# Patient Record
Sex: MALE | Race: OTHER | Employment: OTHER | URBAN - METROPOLITAN AREA
[De-identification: names, ages, dates, MRNs, and addresses within clinical notes are randomized per-mention and may not be internally consistent; named-entity substitution may affect disease eponyms.]

---

## 2017-10-20 ENCOUNTER — HOSPITAL ENCOUNTER (OUTPATIENT)
Dept: PHYSICAL THERAPY | Age: 62
Discharge: HOME | End: 2017-10-20
Payer: OTHER GOVERNMENT

## 2017-10-20 PROCEDURE — 97162 PT EVAL MOD COMPLEX 30 MIN: CPT

## 2017-10-20 PROCEDURE — 97110 THERAPEUTIC EXERCISES: CPT

## 2017-10-20 NOTE — PROGRESS NOTES
PT DAILY TREATMENT NOTE     Patient Name: Cesar Hernandez  Date:10/20/2017  : 1955  [x]  Patient  Verified  Payor:  / Plan: [facility]  RETIREES AND DEPENDENTS / Product Type:  /    In time:1005  Out time:1035  Total Treatment Time (min): 30  Visit #: 1 of 8    Treatment Area: Calcific tendinitis of right shoulder [M75.31]  Calcific tendinitis of left shoulder [M75.32]    SUBJECTIVE  Pain Level (0-10 scale): 2-3/10 seated at rest  Any medication changes, allergies to medications, adverse drug reactions, diagnosis change, or new procedure performed?: [x] No    [] Yes (see summary sheet for update)  Subjective functional status/changes:   [] No changes reported  See POC    OBJECTIVE    Modality rationale:    Min Type Additional Details    [] Estim:  []Unatt       []IFC  []Premod                        []Other:  []w/ice   []w/heat  Position:  Location:    [] Estim: []Att    []TENS instruct  []NMES                    []Other:  []w/US   []w/ice   []w/heat  Position:  Location:    []  Traction: [] Cervical       []Lumbar                       [] Prone          []Supine                       []Intermittent   []Continuous Lbs:  [] before manual  [] after manual    []  Ultrasound: []Continuous   [] Pulsed                           []1MHz   []3MHz W/cm2:  Location:    []  Iontophoresis with dexamethasone         Location: [] Take home patch   [] In clinic    []  Ice     []  heat  []  Ice massage  []  Laser   []  Anodyne Position:  Location:    []  Laser with stim  []  Other:  Position:  Location:    []  Vasopneumatic Device Pressure:       [] lo [] med [] hi   Temperature: [] lo [] med [] hi   [] Skin assessment post-treatment:  []intact []redness- no adverse reaction    []redness - adverse reaction:     22 min [x]Eval                  []Re-Eval       8 min Therapeutic Exercise:  [x] See flow sheet : issued and reviewed HEP   Rationale: increase strength to improve the patients ability to normalize function     min Therapeutic Activity:  []  See flow sheet :         min Neuromuscular Re-education:  []  See flow sheet :        min Manual Therapy:         min Gait Training:  ___ feet with ___ device on level surfaces with ___ level of assist   Rationale: With   [] TE   [] TA   [] neuro   [] other: Patient Education: [x] Review HEP    [] Progressed/Changed HEP based on:   [] positioning   [] body mechanics   [] transfers   [] heat/ice application    [] other:      Other Objective/Functional Measures:   Physical Therapy Evaluation - Shoulder  Pt c/o bilateral shoulder pain (left > right), chronic in nature, and progressively worsening from onset. No mechanism of injury noted. Pt left hand dominant. Pain can wake pt up at night. He also reports intermittent left hand tingling (digits 4-5) as a result of elbow positioning.   PLOF:normal activity, no deficits  Present Functional Limitations: reaching/overhead activity    Posture: [] Poor    [x] Fair    [] Good    Describe:    ROM:  [] Unable to assess at this time                                           AROM                                                              PROM   Left Right  Left Right   Flexion 165 165 Flexion 180 180   Extension   Extension     Scaption/ 150 Scaption//180 180/180   ER @ 0 Degrees   ER @ 0 Degrees     ER @ 90 Degrees 90 90 ER @ 90 Degrees 90 95   IR @ 90 Degrees T12 level T12 level IR @ 90 Degrees 80 90     End Feel / Painful Arc:    Strength:   [] Unable to assess at this time                                                                            L (1-5) R (1-5) Pain   Flexors 5/5 5/5 [] Yes   [x] No   Abductors 4+/5 4+/5 [x] Yes   [] No   External Rotators (@0) 4/5 4+/5 [] Yes   [] No   Internal Rotators (@0) 4+/5 4+/5 [] Yes   [] No   Supraspinatus 4/5 4+5 [x] Yes   [] No   Serratus Anterior   [] Yes   [] No   Lower Trapezius   [] Yes   [] No   Elbow Flexion   [] Yes   [] No   Elbow Extension [] Yes   [] No       Scapulohumoral Control / Rhythm:  Able to eccentrically lower with good control? Left: [x] Yes   [] No     Right: [x] Yes   [] No    Accessory Motions:    Palpation  [] Min  [x] Mod  [] Severe    Location: right UT  [] Min  [x] Mod  [] Severe    Location: left supraspinatus  [] Min  [] Mod  [] Severe    Location:    Optional Tests:    Sensation Left Right Reflexes Left Right   Biceps (C5)   Biceps (C5)     Saha Radial(C6-7)   Brachioradialis (C6)     Saha Ulnar(C8-T1)   Triceps (C7)       Adson's Test  [] Pos   [] Neg Yergason's Test [] Pos   [] Neg  Nila's Test  [] Pos   [] Neg [place]field's Sign [] Pos   [] Neg  Neer's Test  [] Pos   [] Neg Clunk Test  [] Pos   [] Neg  Hawkin's Test  [] Pos   [] Neg AC Joint  [] Pos   [] Neg  Speed's Test  [] Pos   [] Neg SC Joint  [] Pos   [] Neg  Empty Can  [] Pos   [] Neg Pectoral Tightness [] Pos   [] Neg  Anterior Apprehension [] Pos   [] Neg   Posterior Apprehension [] Pos   [] Neg      Other Tests / Comments:   Right MT and LT strength: 4/5  Left MT strength: 4-/5 and LT strength: 4/5     Pain Level (0-10 scale) post treatment: 3/10    ASSESSMENT/Changes in Function: see POC    Patient will continue to benefit from skilled PT services to modify and progress therapeutic interventions, address strength deficits, analyze and address soft tissue restrictions, analyze and cue movement patterns and assess and modify postural abnormalities to attain remaining goals. [x]  See Plan of Care  []  See progress note/recertification  []  See Discharge Summary         Progress towards goals / Updated goals:  See POC      PLAN  [x]  Upgrade activities as tolerated     [x]  Continue plan of care  []  Update interventions per flow sheet       []  Discharge due to:_  [x]  Other: shoulder stretching/strengthening/stability, mod prn, manual techniques      Selvin Campbell, PT 10/20/2017  10:01 AM    No future appointments.

## 2017-10-20 NOTE — PROGRESS NOTES
[facility]  [address] [place], [address] [facility] [place]  Ph [phone]  Fx [phone]    Plan of Care/ Statement of Necessity for Physical Therapy Services    Patient name: Mary Villafana Start of Care: 10/20/2017   Referral source: Alexia Carroll, * : 1955    Medical Diagnosis: Calcific tendinitis of right shoulder [M75.31]  Calcific tendinitis of left shoulder [M75.32]   Onset Date:chronic    Treatment Diagnosis: bilateral shoulder pain   Prior Hospitalization: see medical history Provider#: [id number]   Medications: Verified on Patient summary List    Comorbidities: alcohol and tobacco use   Prior Level of Function: normal activity, no deficits      The Plan of Care and following information is based on the information from the initial evaluation. Assessment/ key information: Pt is a 64 yo male presenting to clinic with c/o bilateral shoulder pain (left > right), chronic in nature, and progressively worsening from onset. No mechanism of injury noted. Pt left hand dominant. Pain can wake pt up at night. He also reports intermittent left hand tingling (digits 4-5) as a result of elbow positioning. On exam, pt has WNL PROM bilateral shoulder, except left shoulder IR mildly limited. Bilateral shoulder AROM WFL for flex and ABD, WNL for ER, and limited for IR. Pt has mild shoulder strength deficits, most notably for ER and supraspinatus on left. He has decreased LT and MT strength bilaterally. Signs/symptoms consistent with RC involvement. Evaluation Complexity History MEDIUM  Complexity : 1-2 comorbidities / personal factors will impact the outcome/ POC ; Examination MEDIUM Complexity : 3 Standardized tests and measures addressing body structure, function, activity limitation and / or participation in recreation  ;Presentation MEDIUM Complexity : Evolving with changing characteristics  ; Clinical Decision Making MEDIUM Complexity : FOTO score of 26-74  Overall Complexity Rating: MEDIUM  Problem List: pain affecting function, decrease ROM, decrease strength, decrease ADL/ functional abilitiies, decrease activity tolerance and decrease flexibility/ joint mobility   Treatment Plan may include any combination of the following: Therapeutic exercise, Therapeutic activities, Neuromuscular re-education, Physical agent/modality, Manual therapy and Patient education  Patient / Family readiness to learn indicated by: asking questions, trying to perform skills and interest  Persons(s) to be included in education: patient (P)  Barriers to Learning/Limitations: None  Patient Goal (s): no pain  Patient Self Reported Health Status: good  Rehabilitation Potential: good    Short Term Goals: To be accomplished in 2 weeks:  1. Patient will be independent and compliant with HEP to achieve other goals. Status at eval: issued and reviewed HEP  2. Pt will report >/= 25% improvement in symptoms to increase activity/position tolerance. Status at eval; 0%    Long Term Goals: To be accomplished in 4 weeks:  1. Improve FOTO score to >/= 71/100 to indicate decreased pain with ADL's. Status at eval: 57/100  2. Pt will report >/= 50% improvement in symptoms to increase activity/position tolerance. Status at eval; 0%  3. Increase left shoulder strength for ER and supraspinatus >/= 4+/5 to normalize function. Status at eval: 4/5 for both  4. Increase bilateral MT and LT strength >/= 4+/5 to normalize function. Status at eval: B LT and right MT: 4/5, left MT: 4-/5    Frequency / Duration: Patient to be seen 2 times per week for 4 weeks. Patient/ Caregiver education and instruction: Diagnosis, prognosis, exercises   [x]  Plan of care has been reviewed with BERTRAND Lanier, PT 10/20/2017 10:45 AM    ________________________________________________________________________    I certify that the above Therapy Services are being furnished while the patient is under my care.  I agree with the treatment plan and certify that this therapy is necessary.     Physician's Signature:____________________  Date:____________Time: _________    Please sign and return to [facility]  [address], [address]  Ph [phone]  Fx [phone]

## 2017-10-24 ENCOUNTER — HOSPITAL ENCOUNTER (OUTPATIENT)
Dept: PHYSICAL THERAPY | Age: 62
Discharge: HOME | End: 2017-10-24
Payer: OTHER GOVERNMENT

## 2017-10-24 PROCEDURE — 97110 THERAPEUTIC EXERCISES: CPT | Performed by: PHYSICAL THERAPIST

## 2017-10-24 PROCEDURE — 97140 MANUAL THERAPY 1/> REGIONS: CPT | Performed by: PHYSICAL THERAPIST

## 2017-10-24 NOTE — PROGRESS NOTES
PT DAILY TREATMENT NOTE     Patient Name: Eugenie Hernandez  Date:10/24/2017  : 1955  [x]  Patient  Verified  Payor:  / Plan: [facility]  RETIREES AND DEPENDENTS / Product Type: Janataliee Austin /    In Sis Anderson  Out time: 500  Total Treatment Time (min): 49  Visit #: 2 of 8    Treatment Area: Calcific tendinitis of right shoulder [M75.31]  Calcific tendinitis of left shoulder [M75.32]    SUBJECTIVE  Pain Level (0-10 scale): 3  Any medication changes, allergies to medications, adverse drug reactions, diagnosis change, or new procedure performed?: [x] No    [] Yes (see summary sheet for update)  Subjective functional status/changes:   [] No changes reported  I have more pain in the right back of shoulder and front of left. The pain still goes into my left 2 fingers. OBJECTIVE    Modality rationale: decrease inflammation, decrease pain and increase tissue extensibility to improve the patients ability to perform ADLs.      Min Type Additional Details    [] Estim:  []Unatt       []IFC  []Premod                        []Other:  []w/ice   []w/heat  Position:  Location:    [] Estim: []Att    []TENS instruct  []NMES                    []Other:  []w/US   []w/ice   []w/heat  Position:  Location:    []  Traction: [] Cervical       []Lumbar                       [] Prone          []Supine                       []Intermittent   []Continuous Lbs:  [] before manual  [] after manual    []  Ultrasound: []Continuous   [] Pulsed                           []1MHz   []3MHz W/cm2:  Location:    []  Iontophoresis with dexamethasone         Location: [] Take home patch   [] In clinic   10 []  Ice     [x]  heat  []  Ice massage  []  Laser   []  Anodyne Position: supine  Location: sangeetha shoulder     []  Laser with stim  []  Other:  Position:  Location:    []  Vasopneumatic Device Pressure:       [] lo [] med [] hi   Temperature: [] lo [] med [] hi   [x] Skin assessment post-treatment:  [x]intact [x]redness- no adverse reaction    []redness - adverse reaction:       27 min Therapeutic Exercise:  [x] See flow sheet :   Rationale: increase ROM, increase strength, improve coordination, improve balance and increase proprioception to improve the patients ability to improve mobility     12 min Manual Therapy:  PROM, MFR and STM to sangeetha shoulders. TPR/    Rationale: decrease pain, increase ROM, increase tissue extensibility and increase postural awareness to improve ADL function. With   [x] TE   [] TA   [] neuro   [] other: Patient Education: [x] Review HEP    [x] Progressed/Changed HEP based on:   [] positioning   [] body mechanics   [] transfers   [x] heat/ice application    [] other:      Other Objective/Functional Measures: moderate TTP and tightness in sangeetha Biceps tendon and muscle belly. Right Supraspinatus and left AC joint. Pain Level (0-10 scale) post treatment: 2 left, 1 right     ASSESSMENT/Changes in Function: pt tolerated there-ex well. However pt reported moderate sensitivity to manual to sangeetha shoulders and both were evenly tender to palpation and MFR and TPR. Chichi Palomo Patient will continue to benefit from skilled PT services to modify and progress therapeutic interventions, address functional mobility deficits, address ROM deficits, address strength deficits, analyze and address soft tissue restrictions, analyze and cue movement patterns, analyze and modify body mechanics/ergonomics, assess and modify postural abnormalities, address imbalance/dizziness and instruct in home and community integration to attain remaining goals. []  See Plan of Care  []  See progress note/recertification  []  See Discharge Summary         Progress towards goals / Updated goals:  Short Term Goals: To be accomplished in 2 weeks:  1. Patient will be independent and compliant with HEP to achieve other goals. Status at eval: issued and reviewed HEP  Current: demonstrated safely     2.  Pt will report >/= 25% improvement in symptoms to increase activity/position tolerance. Status at eval; 0%  Current: n/a     Long Term Goals: To be accomplished in 4 weeks:  1. Improve FOTO score to >/= 71/100 to indicate decreased pain with ADL's. Status at eval: 57/100  Current: n/a    2. Pt will report >/= 50% improvement in symptoms to increase activity/position tolerance. Status at eval; 0%  Current: n/a    3. Increase left shoulder strength for ER and supraspinatus >/= 4+/5 to normalize function. Status at eval: 4/5 for both  Current: n/a    4. Increase bilateral MT and LT strength >/= 4+/5 to normalize function. Status at eval: B LT and right MT: 4/5, left MT: 4-/5  Current: n/a    PLAN  [x]  Upgrade activities as tolerated     [x]  Continue plan of care  []  Update interventions per flow sheet       []  Discharge due to:_  []  Other:_  continue manual to sangeetha shoulders.      Sotero Davila PT 10/24/2017  4:22 PM    Future Appointments  Date Time Provider Slime Jo   10/24/2017 4:30 PM Sotero Davila PT [facility]   10/30/2017 3:30 PM Verenice Arias PT [facility]   11/2/2017 4:30 PM Carrol Angel [facility]   11/7/2017 4:30 PM Sotero Davila [facility]   11/9/2017 4:30 PM [facility] PT [facility]   11/14/2017 4:30 PM Sotero Davila PT [facility]   11/16/2017 4:30 PM [facility] PT [facility]   11/20/2017 4:30 PM Sotero Davila PT [facility]   11/21/2017 4:00 PM Sotero Davila PT [facility]   11/28/2017 4:30 PM [facility] Davila [facility]   11/30/2017 4:30 PM [facility] PT [facility]

## 2017-10-26 ENCOUNTER — APPOINTMENT (OUTPATIENT)
Dept: PHYSICAL THERAPY | Age: 62
End: 2017-10-26
Payer: OTHER GOVERNMENT

## 2017-10-30 ENCOUNTER — HOSPITAL ENCOUNTER (OUTPATIENT)
Dept: PHYSICAL THERAPY | Age: 62
Discharge: HOME | End: 2017-10-30
Payer: OTHER GOVERNMENT

## 2017-10-30 PROCEDURE — 97110 THERAPEUTIC EXERCISES: CPT | Performed by: PHYSICAL THERAPIST

## 2017-10-30 NOTE — PROGRESS NOTES
PT DAILY TREATMENT NOTE     Patient Name: Kyra Hernandez  Date:10/30/2017  : 1955  [x]  Patient  Verified  Payor:  / Plan: [facility]  RETIREES AND DEPENDENTS / Product Type: Fairy Carol /    In time:1530  Out time:1630  Total Treatment Time (min): 60  Visit #: 3 of 8    Treatment Area: Calcific tendinitis of right shoulder [M75.31]  Calcific tendinitis of left shoulder [M75.32]    SUBJECTIVE  Pain Level (0-10 scale): 0  Any medication changes, allergies to medications, adverse drug reactions, diagnosis change, or new procedure performed?: [x] No    [] Yes (see summary sheet for update)  Subjective functional status/changes:   [x] No changes reported    OBJECTIVE    Modality rationale: decrease edema and decrease pain to improve the patients ability to perform functional mobility    Min Type Additional Details    [] Estim:  []Unatt       []IFC  []Premod                        []Other:  []w/ice   []w/heat  Position:  Location:    [] Estim: []Att    []TENS instruct  []NMES                    []Other:  []w/US   []w/ice   []w/heat  Position:  Location:    []  Traction: [] Cervical       []Lumbar                       [] Prone          []Supine                       []Intermittent   []Continuous Lbs:  [] before manual  [] after manual    []  Ultrasound: []Continuous   [] Pulsed                           []1MHz   []3MHz W/cm2:  Location:    []  Iontophoresis with dexamethasone         Location: [] Take home patch   [] In clinic   10 [x]  Ice     []  heat  []  Ice massage  []  Laser   []  Anodyne Position:sitting   Location:bilateral shoulder     []  Laser with stim  []  Other:  Position:  Location:    []  Vasopneumatic Device Pressure:       [] lo [] med [] hi   Temperature: [] lo [] med [] hi   [x] Skin assessment post-treatment:  [x]intact [x]redness- no adverse reaction    []redness - adverse reaction:         50 min Therapeutic Exercise:  [x] See flow sheet :   Rationale: increase ROM, increase strength, improve coordination, improve balance and increase proprioception to improve the patients ability to improve mobility              With   [] TE   [] TA   [] neuro   [] other: Patient Education: [x] Review HEP    [] Progressed/Changed HEP based on:   [] positioning   [] body mechanics   [] transfers   [] heat/ice application    [] other:      Other Objective/Functional Measures: shoulder ROM bilateral  ,  , trunk rotation in sitting WNL = bilateral      Pain Level (0-10 scale) post treatment: 0    ASSESSMENT/Changes in Function: tolerated exercises well shoulder progress resist next session with meghann ex     Patient will continue to benefit from skilled PT services to modify and progress therapeutic interventions, address functional mobility deficits, address ROM deficits, address strength deficits, analyze and address soft tissue restrictions, analyze and cue movement patterns, analyze and modify body mechanics/ergonomics and assess and modify postural abnormalities to attain remaining goals. [x]  See Plan of Care  []  See progress note/recertification  []  See Discharge Summary         Progress towards goals / Updated goals:  Short Term Goals: To be accomplished in 2 weeks:  1. Patient will be independent and compliant with HEP to achieve other goals. Status at eval: issued and reviewed HEP  Current: demonstrated safely      2. Pt will report >/= 25% improvement in symptoms to increase activity/position tolerance. Status at eval; 0%  Current: n/a      Long Term Goals: To be accomplished in 4 weeks:  1. Improve FOTO score to >/= 71/100 to indicate decreased pain with ADL's. Status at eval: 57/100  Current: n/a     2. Pt will report >/= 50% improvement in symptoms to increase activity/position tolerance. Status at eval; 0%  Current: n/a     3. Increase left shoulder strength for ER and supraspinatus >/= 4+/5 to normalize function.   Status at eval: 4/5 for both  Current: n/a     4. Increase bilateral MT and LT strength >/= 4+/5 to normalize function.   Status at eval: B LT and right MT: 4/5, left MT: 4-/5  Current: n/a       PLAN  [x]  Upgrade activities as tolerated     [x]  Continue plan of care  []  Update interventions per flow sheet       []  Discharge due to:_  []  Other:_      [place] Share, PT 10/30/2017  4:35 PM    Future Appointments  Date Time Provider Slime Jo   11/2/2017 4:30 PM [facility]ina [facility]   11/7/2017 4:30 PM Cherylececil Corrigan, [facility]   11/9/2017 4:30 [place] [facility]   11/14/2017 4:30 PM Cheryle Lyme, [facility]   11/16/2017 4:30 PM [facility]   11/20/2017 4:30 PM Cheryle Lyme, [facility]   11/21/2017 4:00 PM Cheryle Lyme, [facility]   11/28/2017 4:30 PM Cheryle Lyme, [facility]   11/30/2017 4:30 PM [facility]

## 2017-11-02 ENCOUNTER — HOSPITAL ENCOUNTER (OUTPATIENT)
Dept: PHYSICAL THERAPY | Age: 62
Discharge: HOME | End: 2017-11-02
Payer: OTHER GOVERNMENT

## 2017-11-02 PROCEDURE — 97112 NEUROMUSCULAR REEDUCATION: CPT

## 2017-11-02 PROCEDURE — 97110 THERAPEUTIC EXERCISES: CPT

## 2017-11-02 NOTE — PROGRESS NOTES
PT DAILY TREATMENT NOTE     Patient Name: Zoya Hernandez  Date:2017  : 1955  [x]  Patient  Verified  Payor:  / Plan: [facility]  RETIREES AND DEPENDENTS / Product Type:  /    In time:425  Out time:513  Total Treatment Time (min): 48  Visit #: 4 of 8    Treatment Area: Calcific tendinitis of right shoulder [M75.31]  Calcific tendinitis of left shoulder [M75.32]    SUBJECTIVE  Pain Level (0-10 scale): 2/10  Any medication changes, allergies to medications, adverse drug reactions, diagnosis change, or new procedure performed?: [x] No    [] Yes (see summary sheet for update)  Subjective functional status/changes:   [] No changes reported  Hurts here (points to right UT), but not in shoulder itself.     OBJECTIVE    Modality rationale: decrease pain and increase tissue extensibility to improve the patients ability to increase activity/position tolerance   Min Type Additional Details    [] Estim:  []Unatt       []IFC  []Premod                        []Other:  []w/ice   []w/heat  Position:  Location:    [] Estim: []Att    []TENS instruct  []NMES                    []Other:  []w/US   []w/ice   []w/heat  Position:  Location:    []  Traction: [] Cervical       []Lumbar                       [] Prone          []Supine                       []Intermittent   []Continuous Lbs:  [] before manual  [] after manual    []  Ultrasound: []Continuous   [] Pulsed                           []1MHz   []3MHz W/cm2:  Location:    []  Iontophoresis with dexamethasone         Location: [] Take home patch   [] In clinic   10 []  Ice     [x]  heat  []  Ice massage  []  Laser   []  Anodyne Position: supine  Location:B shoulders    []  Laser with stim  []  Other:  Position:  Location:    []  Vasopneumatic Device Pressure:       [] lo [] med [] hi   Temperature: [] lo [] med [] hi   [] Skin assessment post-treatment:  []intact []redness- no adverse reaction    []redness - adverse reaction:      min []Eval []Re-Eval       25 min Therapeutic Exercise:  [x] See flow sheet :   Rationale: increase ROM and increase strength to improve the patients ability to normalize ADL's     min Therapeutic Activity:  []  See flow sheet :        13 min Neuromuscular Re-education:  [x]  See flow sheet :   Rationale: increase strength and increase proprioception  to improve the patients ability to normalize function     min Manual Therapy:          min Gait Training:  ___ feet with ___ device on level surfaces with ___ level of assist   Rationale: With   [] TE   [] TA   [] neuro   [] other: Patient Education: [x] Review HEP    [] Progressed/Changed HEP based on:   [] positioning   [] body mechanics   [] transfers   [] heat/ice application    [] other:      Other Objective/Functional Measures: none taken today     Pain Level (0-10 scale) post treatment: 0/10    ASSESSMENT/Changes in Function: No new progress. Patient will continue to benefit from skilled PT services to modify and progress therapeutic interventions, address ROM deficits, address strength deficits, analyze and address soft tissue restrictions, analyze and cue movement patterns, analyze and modify body mechanics/ergonomics and assess and modify postural abnormalities to attain remaining goals. []  See Plan of Care  []  See progress note/recertification  []  See Discharge Summary         Progress towards goals / Updated goals:  Short Term Goals: To be accomplished in 2 weeks:  1. Patient will be independent and compliant with HEP to achieve other goals. Status at eval: issued and reviewed HEP  Current: demonstrated safely       2. Pt will report >/= 25% improvement in symptoms to increase activity/position tolerance. Status at eval; 0%  Current: n/a      Long Term Goals: To be accomplished in 4 weeks:  1. Improve FOTO score to >/= 71/100 to indicate decreased pain with ADL's. Status at eval: 57/100  Current: retest visit 5      2.  Pt will report >/= 50% improvement in symptoms to increase activity/position tolerance. Status at eval; 0%  Current: n/a      3. Increase left shoulder strength for ER and supraspinatus >/= 4+/5 to normalize function. Status at eval: 4/5 for both  Current: n/a      4. Increase bilateral MT and LT strength >/= 4+/5 to normalize function.   Status at eval: B LT and right MT: 4/5, left MT: 4-/5  Current: n/a      PLAN  [x]  Upgrade activities as tolerated     [x]  Continue plan of care  []  Update interventions per flow sheet       []  Discharge due to:_  [x]  Other: FOTO next visit      Jass Tang PT 11/2/2017  4:27 PM    Future Appointments  Date Time Provider [place]   11/2/2017 4:30 PM [facility]   11/7/2017 4:30 PM Juarez Warner [facility]   11/9/2017 4:30 PM [facility]   11/14/2017 4:30 PM Juarez Warner PT [facility]   11/16/2017 4:30 PM [facility]   11/20/2017 4:30 PM Juarez Warner, [facility] [facility]   11/21/2017 4:00 PM Juarez Warner PT [facility]   11/28/2017 4:30 PM Juarez Warner PT [facility]   11/30/2017 4:30 PM [facility]

## 2017-11-07 ENCOUNTER — APPOINTMENT (OUTPATIENT)
Dept: PHYSICAL THERAPY | Age: 62
End: 2017-11-07
Payer: OTHER GOVERNMENT

## 2017-11-09 ENCOUNTER — APPOINTMENT (OUTPATIENT)
Dept: PHYSICAL THERAPY | Age: 62
End: 2017-11-09
Payer: OTHER GOVERNMENT

## 2017-11-14 ENCOUNTER — APPOINTMENT (OUTPATIENT)
Dept: PHYSICAL THERAPY | Age: 62
End: 2017-11-14
Payer: OTHER GOVERNMENT

## 2017-11-16 ENCOUNTER — APPOINTMENT (OUTPATIENT)
Dept: PHYSICAL THERAPY | Age: 62
End: 2017-11-16
Payer: OTHER GOVERNMENT

## 2017-11-20 ENCOUNTER — APPOINTMENT (OUTPATIENT)
Dept: PHYSICAL THERAPY | Age: 62
End: 2017-11-20
Payer: OTHER GOVERNMENT

## 2017-11-21 ENCOUNTER — APPOINTMENT (OUTPATIENT)
Dept: PHYSICAL THERAPY | Age: 62
End: 2017-11-21
Payer: OTHER GOVERNMENT

## 2017-11-28 ENCOUNTER — APPOINTMENT (OUTPATIENT)
Dept: PHYSICAL THERAPY | Age: 62
End: 2017-11-28
Payer: OTHER GOVERNMENT

## 2017-11-30 ENCOUNTER — APPOINTMENT (OUTPATIENT)
Dept: PHYSICAL THERAPY | Age: 62
End: 2017-11-30
Payer: OTHER GOVERNMENT